# Patient Record
Sex: MALE | Race: WHITE | NOT HISPANIC OR LATINO | Employment: FULL TIME | URBAN - METROPOLITAN AREA
[De-identification: names, ages, dates, MRNs, and addresses within clinical notes are randomized per-mention and may not be internally consistent; named-entity substitution may affect disease eponyms.]

---

## 2024-01-22 ENCOUNTER — HOSPITAL ENCOUNTER (EMERGENCY)
Facility: HOSPITAL | Age: 49
Discharge: HOME | End: 2024-01-22
Attending: EMERGENCY MEDICINE
Payer: COMMERCIAL

## 2024-01-22 VITALS
DIASTOLIC BLOOD PRESSURE: 75 MMHG | HEIGHT: 67 IN | TEMPERATURE: 98 F | SYSTOLIC BLOOD PRESSURE: 124 MMHG | HEART RATE: 79 BPM | RESPIRATION RATE: 12 BRPM | WEIGHT: 140 LBS | BODY MASS INDEX: 21.97 KG/M2 | OXYGEN SATURATION: 100 %

## 2024-01-22 DIAGNOSIS — R06.02 SOB (SHORTNESS OF BREATH): ICD-10-CM

## 2024-01-22 LAB
ALBUMIN SERPL-MCNC: 4.4 G/DL (ref 3.5–5)
ALBUMIN/GLOB SERPL: 1.5 RATIO (ref 1.1–2)
ALP SERPL-CCNC: 65 UNIT/L (ref 40–150)
ALT SERPL-CCNC: 44 UNIT/L (ref 0–55)
AST SERPL-CCNC: 30 UNIT/L (ref 5–34)
BASOPHILS # BLD AUTO: 0.05 X10(3)/MCL
BASOPHILS NFR BLD AUTO: 0.7 %
BILIRUB SERPL-MCNC: 1 MG/DL
BNP BLD-MCNC: <10 PG/ML
BUN SERPL-MCNC: 15.1 MG/DL (ref 8.9–20.6)
CALCIUM SERPL-MCNC: 9.9 MG/DL (ref 8.4–10.2)
CHLORIDE SERPL-SCNC: 105 MMOL/L (ref 98–107)
CO2 SERPL-SCNC: 24 MMOL/L (ref 22–29)
CREAT SERPL-MCNC: 0.99 MG/DL (ref 0.73–1.18)
EOSINOPHIL # BLD AUTO: 0.21 X10(3)/MCL (ref 0–0.9)
EOSINOPHIL NFR BLD AUTO: 3.1 %
ERYTHROCYTE [DISTWIDTH] IN BLOOD BY AUTOMATED COUNT: 12.7 % (ref 11.5–17)
GFR SERPLBLD CREATININE-BSD FMLA CKD-EPI: >60 MLS/MIN/1.73/M2
GLOBULIN SER-MCNC: 2.9 GM/DL (ref 2.4–3.5)
GLUCOSE SERPL-MCNC: 145 MG/DL (ref 74–100)
HCT VFR BLD AUTO: 46.2 % (ref 42–52)
HGB BLD-MCNC: 15.5 G/DL (ref 14–18)
IMM GRANULOCYTES # BLD AUTO: 0.01 X10(3)/MCL (ref 0–0.04)
IMM GRANULOCYTES NFR BLD AUTO: 0.1 %
LYMPHOCYTES # BLD AUTO: 2.33 X10(3)/MCL (ref 0.6–4.6)
LYMPHOCYTES NFR BLD AUTO: 34.3 %
MCH RBC QN AUTO: 30.7 PG (ref 27–31)
MCHC RBC AUTO-ENTMCNC: 33.5 G/DL (ref 33–36)
MCV RBC AUTO: 91.5 FL (ref 80–94)
MONOCYTES # BLD AUTO: 0.51 X10(3)/MCL (ref 0.1–1.3)
MONOCYTES NFR BLD AUTO: 7.5 %
NEUTROPHILS # BLD AUTO: 3.69 X10(3)/MCL (ref 2.1–9.2)
NEUTROPHILS NFR BLD AUTO: 54.3 %
NRBC BLD AUTO-RTO: 0 %
PLATELET # BLD AUTO: 250 X10(3)/MCL (ref 130–400)
PMV BLD AUTO: 11 FL (ref 7.4–10.4)
POTASSIUM SERPL-SCNC: 4.2 MMOL/L (ref 3.5–5.1)
PROT SERPL-MCNC: 7.3 GM/DL (ref 6.4–8.3)
RBC # BLD AUTO: 5.05 X10(6)/MCL (ref 4.7–6.1)
SODIUM SERPL-SCNC: 136 MMOL/L (ref 136–145)
TROPONIN I SERPL-MCNC: <0.01 NG/ML (ref 0–0.04)
TROPONIN I SERPL-MCNC: <0.01 NG/ML (ref 0–0.04)
WBC # SPEC AUTO: 6.8 X10(3)/MCL (ref 4.5–11.5)

## 2024-01-22 PROCEDURE — 84484 ASSAY OF TROPONIN QUANT: CPT | Performed by: EMERGENCY MEDICINE

## 2024-01-22 PROCEDURE — 93010 ELECTROCARDIOGRAM REPORT: CPT | Mod: ,,, | Performed by: STUDENT IN AN ORGANIZED HEALTH CARE EDUCATION/TRAINING PROGRAM

## 2024-01-22 PROCEDURE — 99285 EMERGENCY DEPT VISIT HI MDM: CPT | Mod: 25

## 2024-01-22 PROCEDURE — 85025 COMPLETE CBC W/AUTO DIFF WBC: CPT | Performed by: EMERGENCY MEDICINE

## 2024-01-22 PROCEDURE — 80053 COMPREHEN METABOLIC PANEL: CPT | Performed by: EMERGENCY MEDICINE

## 2024-01-22 PROCEDURE — 93005 ELECTROCARDIOGRAM TRACING: CPT

## 2024-01-22 PROCEDURE — 83880 ASSAY OF NATRIURETIC PEPTIDE: CPT | Performed by: EMERGENCY MEDICINE

## 2024-01-22 RX ORDER — HYDROXYZINE HYDROCHLORIDE 25 MG/1
25 TABLET, FILM COATED ORAL 4 TIMES DAILY PRN
Qty: 12 TABLET | Refills: 0 | Status: SHIPPED | OUTPATIENT
Start: 2024-01-22

## 2024-01-22 NOTE — ED PROVIDER NOTES
"Encounter Date: 1/22/2024    SCRIBE #1 NOTE: I, Abel Rocadeaux, am scribing for, and in the presence of,  Alexi Ledesma MD. I have scribed the following portions of the note - Other sections scribed: HPI, ROS, PE, MDM.       History     Chief Complaint   Patient presents with    Weakness     Reports weakness & "feeling like I'm about to pass out" that started this morning. Also reports SOB. Denies chest pain.     A 47 y/o male with a history of hyperlipidemia presents to [facility] ED with onset of shortness of breath, lightheadedness, and right upper extremity weakness/numbness since waking up this morning. Patient denies congestion and a sore throat. Patient reports that he has a family history of cardiac problems.     Primary: Tristan Arriaga MD  Cardiologist: Dr Cifuentes    The history is provided by the patient and medical records.     Review of patient's allergies indicates:  No Known Allergies  History reviewed. No pertinent past medical history.  History reviewed. No pertinent surgical history.  History reviewed. No pertinent family history.     Review of Systems   Constitutional:  Negative for chills and fever.   HENT:  Negative for congestion and sore throat.    Respiratory:  Positive for shortness of breath. Negative for cough.    Cardiovascular:  Negative for chest pain.   Gastrointestinal:  Negative for abdominal pain, nausea and vomiting.   Musculoskeletal:  Negative for myalgias.   Neurological:  Positive for weakness, light-headedness and numbness. Negative for syncope.   All other systems reviewed and are negative.      Physical Exam     Initial Vitals [01/22/24 0743]   BP Pulse Resp Temp SpO2   (!) 132/90 94 18 97.6 °F (36.4 °C) 100 %      MAP       --         Physical Exam    Constitutional: He appears well-developed and well-nourished. No distress.   HENT:   Head: Normocephalic and atraumatic.   Cardiovascular:  Normal rate.           Pulmonary/Chest: No respiratory distress. He has no wheezes. He " has no rhonchi. He exhibits no tenderness.   Abdominal: Abdomen is soft. He exhibits no distension. There is no abdominal tenderness. There is no rebound and no guarding.   Musculoskeletal:         General: Normal range of motion.     Neurological: He is alert and oriented to person, place, and time. He has normal strength. No cranial nerve deficit.   Skin: Skin is warm and dry.   Psychiatric: He has a normal mood and affect.         ED Course   Procedures  Labs Reviewed   COMPREHENSIVE METABOLIC PANEL - Abnormal; Notable for the following components:       Result Value    Glucose Level 145 (*)     All other components within normal limits   CBC WITH DIFFERENTIAL - Abnormal; Notable for the following components:    MPV 11.0 (*)     All other components within normal limits   TROPONIN I - Normal   B-TYPE NATRIURETIC PEPTIDE - Normal   TROPONIN I - Normal   CBC W/ AUTO DIFFERENTIAL    Narrative:     The following orders were created for panel order CBC auto differential.  Procedure                               Abnormality         Status                     ---------                               -----------         ------                     CBC with Differential[[id number]]        Abnormal            Final result                 Please view results for these tests on the individual orders.        ECG Results              EKG 12-lead (Final result)  Result time 01/22/24 11:37:42      Final result by Interface, Lab In [facility] (01/22/24 11:37:42)                   Narrative:    Test Reason : R06.02,    Vent. Rate : 092 BPM     Atrial Rate : 092 BPM     P-R Int : 166 ms          QRS Dur : 096 ms      QT Int : 338 ms       P-R-T Axes : 064 061 040 degrees     QTc Int : 417 ms    Normal sinus rhythm  Normal ECG  No previous ECGs available  Confirmed by Jesus Chavarria MD ([id number]) on 1/22/2024 11:37:37 AM    Referred By:             Confirmed By:Jesus Chavarria MD                      Wet Read by Alexi Ledesma MD (01/22/24  09:05:22, [facility])    EKG at 0740.  92 beats per minute normal sinus rhythm no ST elevation or depression.  Normal axis                                  Imaging Results              X-Ray Chest AP (Final result)  Result time 01/22/24 08:07:18      Final result by Jonas Lemons MD (01/22/24 08:07:18)                   Impression:      No acute cardiopulmonary process.      Electronically signed by: Jonas Lemons  Date:    01/22/2024  Time:    08:07               Narrative:    EXAMINATION:  XR CHEST AP PORTABLE    CLINICAL HISTORY:  shortness;    TECHNIQUE:  Single view of the chest    COMPARISON:  04/26/2019    FINDINGS:  No focal opacification, pleural effusion, or pneumothorax.    The cardiomediastinal silhouette is within normal limits.    No acute osseous abnormality.                                       Medications - No data to display  Medical Decision Making  Differential diagnosis includes but is not limited to viral infection, myocardial infarction, dehydration, and electrolyte abnormality.     Amount and/or Complexity of Data Reviewed  Labs: ordered.  Radiology: ordered.  ECG/medicine tests: ordered and independent interpretation performed.    Risk  Prescription drug management.            Scribe Attestation:   Scribe #1: I performed the above scribed service and the documentation accurately describes the services I performed. I attest to the accuracy of the note.    Attending Attestation:           Physician Attestation for Scribe:  Physician Attestation Statement for Scribe #1: I, Alexi Ledesma MD, reviewed documentation, as scribed by Abel Yao in my presence, and it is both accurate and complete.             ED Course as of 01/22/24 1155   Mon Jan 22, 2024   0944 Symptoms do not sound consistent with CAD but does have family history in his worried about that.  Will get repeat troponin.  Initial troponin and EKG are reassuring.   Significant other at bedside states he has had history of panic attack and anxiety before this could be contributing.  I explained that a viral syndrome could also explain his symptoms of myalgia lightheadedness and shortness of breath.  No low extremity swelling no tachypnea no hypoxia no tachycardia do not believe he has a pulmonary embolus [LF]   1154 Troponin is un changed still negative.  Symptoms could be related to a viral syndrome could be related to anxiety [LF]      ED Course User Index  [LF] Alexi Ledesma MD                           Clinical Impression:  Final diagnoses:  [R06.02] SOB (shortness of breath)          ED Disposition Condition    Discharge Stable          ED Prescriptions       Medication Sig Dispense Start Date End Date Auth. Provider    hydrOXYzine HCL (ATARAX) 25 MG tablet Take 1 tablet (25 mg total) by mouth 4 (four) times daily as needed for Anxiety. 12 tablet 1/22/2024 -- Alexi Ledesma MD          Follow-up Information       Follow up With Specialties Details Why Contact Info    Tristan Arriaga MD Family Medicine   [facility]ly [facility], [id number] [address]  [phone]               Alexi Ledesma MD  01/22/24 9222